# Patient Record
Sex: MALE | Race: WHITE | Employment: FULL TIME | URBAN - METROPOLITAN AREA
[De-identification: names, ages, dates, MRNs, and addresses within clinical notes are randomized per-mention and may not be internally consistent; named-entity substitution may affect disease eponyms.]

---

## 2019-05-29 ENCOUNTER — TRANSFERRED RECORDS (OUTPATIENT)
Dept: HEALTH INFORMATION MANAGEMENT | Facility: CLINIC | Age: 24
End: 2019-05-29

## 2019-05-31 DIAGNOSIS — R86.9 ABNORMAL SEMEN ANALYSIS: Primary | ICD-10-CM

## 2019-05-31 NOTE — PROGRESS NOTES
Semen analysis notable for high viscosity, borderline speed of progression 2-3 and borderline morphology at 2%. This was a repeat sample with similar findings.

## 2019-06-17 ENCOUNTER — PRE VISIT (OUTPATIENT)
Dept: UROLOGY | Facility: CLINIC | Age: 24
End: 2019-06-17

## 2019-06-17 NOTE — TELEPHONE ENCOUNTER
MEDICAL RECORDS REQUEST   [facility]  Urology Clinic  [address] [place] [address]  PHONE: [phone]  Fax: [phone]        FUTURE VISIT INFORMATION                                                   Pop Calvillo, : 1995 scheduled for future visit at [facility]    APPOINTMENT INFORMATION:    Date: 19 7:40AM    Provider:  Maximo Curran MD    Reason for Visit/Diagnosis: Abnormal SA results     REFERRAL INFORMATION:     Referring provider:  STEFANIE BUTT     Specialty: MD    Referring providers clinic:  [facility]    Clinic contact number:  [phone]    RECORDS REQUESTED FOR VISIT                                                     NOTES  STATUS/DETAILS   OFFICE NOTE from referring provider  yes   OFFICE NOTE from other specialist  yes   DISCHARGE SUMMARY from hospital  no   DISCHARGE REPORT from the ER  no   OPERATIVE REPORT  no   MEDICATION LIST  no     PRE-VISIT CHECKLIST      Record collection complete YES- Received recs (SA's)  and handed them to provider - recs sent to HIM to scan   If no, please explain:  Called and LM for pt to return my call RE. His outside SA -  Called and LM 19 (2nd attempt)   Called and LM 19 (3rd attempt)     Pt returned my call said his recs are coming from [facility]-   Called [facility] this morning to check on status, clinic not open until 8AM -      Appointment appropriately scheduled           (right time/right provider) Yes   MyChart activation If no, please explain: In process    Questionnaire complete If no, please explain: In process      Completed by: Sammi Huddleston

## 2019-08-02 ENCOUNTER — PRE VISIT (OUTPATIENT)
Dept: UROLOGY | Facility: CLINIC | Age: 24
End: 2019-08-02

## 2019-08-02 NOTE — TELEPHONE ENCOUNTER
Reason for Visit: Fertility discussion    Diagnosis: abnormal SA    Orders/Procedures/Records: in process    Contact Patient: in process    Rooming Requirements: normal      Olya Aldrich LPN  08/02/19  6:36 AM

## 2019-08-08 ENCOUNTER — OFFICE VISIT (OUTPATIENT)
Dept: UROLOGY | Facility: CLINIC | Age: 24
End: 2019-08-08
Attending: OBSTETRICS & GYNECOLOGY
Payer: COMMERCIAL

## 2019-08-08 VITALS
HEIGHT: 72 IN | BODY MASS INDEX: 29.8 KG/M2 | SYSTOLIC BLOOD PRESSURE: 129 MMHG | DIASTOLIC BLOOD PRESSURE: 83 MMHG | HEART RATE: 68 BPM | WEIGHT: 220 LBS

## 2019-08-08 DIAGNOSIS — E29.1 TESTICULAR HYPOFUNCTION: ICD-10-CM

## 2019-08-08 DIAGNOSIS — R86.8 TERATOSPERMIA: Primary | ICD-10-CM

## 2019-08-08 ASSESSMENT — ENCOUNTER SYMPTOMS
NECK PAIN: 1
MYALGIAS: 1
BACK PAIN: 1
STIFFNESS: 1
MUSCLE WEAKNESS: 0
ARTHRALGIAS: 1
MUSCLE CRAMPS: 0
JOINT SWELLING: 0

## 2019-08-08 ASSESSMENT — PATIENT HEALTH QUESTIONNAIRE - PHQ9
SUM OF ALL RESPONSES TO PHQ QUESTIONS 1-9: 7
SUM OF ALL RESPONSES TO PHQ QUESTIONS 1-9: 7

## 2019-08-08 ASSESSMENT — PAIN SCALES - GENERAL: PAINLEVEL: NO PAIN (0)

## 2019-08-08 ASSESSMENT — MIFFLIN-ST. JEOR: SCORE: 2020.91

## 2019-08-08 NOTE — NURSING NOTE
Chief Complaint   Patient presents with     Consult     Fertility discussion       Bina Joseph MA

## 2019-08-08 NOTE — LETTER
8/8/2019       RE: Pop Calvillo  [address] [facility] [address]     Dear Colleague,    Thank you for referring your patient, Pop Calvillo, to the [facility] UROLOGY AND INST FOR PROSTATE AND UROLOGIC CANCERS at [facility]. Please see a copy of my visit note below.    Dear Dr. Jamee Stephenson, it was my pleasure to see Mr. Pop Calvillo, a 24 year old male here in consultation today for fertility evaluation.      HPI  Pop Calvillo is a 24 year old male with no significant PMH.  He and his partner have been attempting to conceive for the last 11 months.  They have no previous pregnancy together.  No pregnacies with other partners reported.  They have tried timed intercourse for 7 months, including OPK. They have tried IUI but have not tried IVF.  They do not use lubrication during intercourse.  No associated conditions such as ED or sexual dysfunction.     The patient's partner, Olya, is 25 years old.  She is in good health.  She has never been pregnant.  She has been evaluated for infertility and has been found to have PCOS, taking Femara. She was not ovulating on her own but is now having normal monthly menstrual cycles for the last 7 months.        PAST MEDICAL HISTORY:    No chronic medical problems   Puberty normal - was earlier than normal around age 9.  No associated conditions such as ED or sexual dysfunction.  No  problems.     PAST SURG HISTORY  No history of surgery.      Medications as of 8/8/2019:  No prescription medications     ALLERGY:     Allergies   Allergen Reactions     No Known Drug Allergies        SOCIAL HISTORY:  . Occupation: .  No alcohol abuse, drinks socially a few times/month. Is a daily smoker.  Social History     Tobacco Use     Smoking status: Current Every Day Smoker     Packs/day: 1.00     Years: 4.00     Pack years: 4.00     Smokeless tobacco: Former User   Substance Use Topics     Alcohol use: No     Drug  use: None         FAMILY HISTORY: No inherited disorders. Parents did take a few years to get pregnant.  Diabetes runs in family.    REVIEW OF SYSTEMS:  Denies erectile dysfunction, ejaculatory problems, testicular pain. No vision or smell deficits, no chronic sinus or respiratory infections. No recent febrile illness, weight loss. No heat or cold intolerance, gynecomastia, or other endocrine complaints.     Otherwise, no constitutional, eye, ENT, heart, lung, GI , musculoskeletal, skin, neurologic, psychiatric, or hematologic complaints.    GONADOTOXIN EXPOSURE: Unremarkable. Otherwise negative for marijuana, heat, chemicals, pesticides, heavy metals, steroids, chemotherapy or radiation. 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    GENERAL PHYSICAL EXAM  /83   Pulse 68   Ht 1.829 m (6')   Wt 99.8 kg (220 lb)   BMI 29.84 kg/m      Constitutional: No acute distress. Well nourished.   PSYCH: normal mood and affect.  NEURO: normal gait, no focal deficits.   EYES: anicteric, EOMI, PERR  ENT: neck supple,  mucosae moist, no thrush.  CARDIOPULMONARY: breathing non-labored, pulse regular, no peripheral edema.  GI: Abdomen soft, non-tender, no surgical scars, no organomegaly.  MUSCULOSKELETAL: normal limb proportions, no muscle wasting, no contractures.  SKIN: Normal virilized hair distribution, no lesions, warts or rashes over genitalia, abdomen extremities or face.  HEME/LYMPH: no ecchymosis, no lymphadenopathy in groin or neck, no lymphedema.     EXAM:  Phallus circumcised, meatus adequate, no plaques palpated.   Left testis descended , size is 20 cc , consistency is smooth. No intra-testicular masses.  Right testis descended , size is 20 cc , consistency is smooth. No intra-testicular masses.   Epididymes present, non-tender, not enlarged.   Left cord: Vas present. No definite varicocele on exam.  Right cord: Vas present. No obvious varicocele.     Rectal exam deferred.      LABS:  [facility]    5/29/2019  Semen Analysis:  (normal range in parenthesis)   -Volume: 3 ml (1.5-5.0)   -pH: 8.0 (>7.2)   -Concentration: 28.3 million/ml (>15 million/ml)   -% Forward progressive: 84% (>30%)   -Total progressive motile count: 35.7 million (>15.6 million)   -% Normal morphology: 2% (>4%)    4/29/2019 Semen Analysis:  (normal range in parenthesis)   -Volume: 1.8 ml (1.5-5.0)   -pH: 7.6 (>7.2)   -Concentration: 54.3 million/ml (>15 million/ml)   -% Forward progressive: 92% (>30%)   -Total progressive motile count: 44.9 million (>15.6 million)   -% Normal morphology: 3% (>4%)    IMAGING:  No recent relevant imaging    ASSESSMENT:  Infertility.  Female with PCOS.   Testicular hypofunction: Teratospermia.    PLAN:    Discussed OTC supplements to consider taking    He declines blood draw to check baseline hormones.  Major abnormalities are not suspected.    Semen parameters are within acceptable limits for IUI (intrauterine inseminations).    Discussed teratospermia- given fairly ubiquitous finding among high-quality diagnostic fertility labs, this makes the clinical utility questionable.    Advised smoking cessation.    Dr. Curran will contact patient with results and plan/options    Patient was seen and examined with Dr. Magdy Chavez MD  Urology Resident     I saw and examined the patient with the resident today.  I agree with the resident note and plan of care as above.     Maximo Curran MD  Urology Staff

## 2019-08-08 NOTE — PROGRESS NOTES
Dear Dr. Jamee Stephenson, it was my pleasure to see . Pop Calvillo, a 24 year old male here in consultation today for fertility evaluation.      HPI  Pop Calvillo is a 24 year old male with no significant PMH.  He and his partner have been attempting to conceive for the last 11 months.  They have no previous pregnancy together.  No pregnacies with other partners reported.  They have tried timed intercourse for 7 months, including OPK. They have tried IUI but have not tried IVF.  They do not use lubrication during intercourse.  No associated conditions such as ED or sexual dysfunction.     The patient's partner, Olya, is 25 years old.  She is in good health.  She has never been pregnant.  She has been evaluated for infertility and has been found to have PCOS, taking Femara. She was not ovulating on her own but is now having normal monthly menstrual cycles for the last 7 months.        PAST MEDICAL HISTORY:    No chronic medical problems   Puberty normal - was earlier than normal around age 9.  No associated conditions such as ED or sexual dysfunction.  No  problems.     PAST SURG HISTORY  No history of surgery.      Medications as of 8/8/2019:  No prescription medications     ALLERGY:     Allergies   Allergen Reactions     No Known Drug Allergies        SOCIAL HISTORY:  . Occupation: .  No alcohol abuse, drinks socially a few times/month. Is a daily smoker.  Social History     Tobacco Use     Smoking status: Current Every Day Smoker     Packs/day: 1.00     Years: 4.00     Pack years: 4.00     Smokeless tobacco: Former User   Substance Use Topics     Alcohol use: No     Drug use: None         FAMILY HISTORY: No inherited disorders. Parents did take a few years to get pregnant.  Diabetes runs in family.    REVIEW OF SYSTEMS:  Denies erectile dysfunction, ejaculatory problems, testicular pain. No vision or smell deficits, no chronic sinus or respiratory infections. No recent febrile  illness, weight loss. No heat or cold intolerance, gynecomastia, or other endocrine complaints.     Otherwise, no constitutional, eye, ENT, heart, lung, GI , musculoskeletal, skin, neurologic, psychiatric, or hematologic complaints.    GONADOTOXIN EXPOSURE: Unremarkable. Otherwise negative for marijuana, heat, chemicals, pesticides, heavy metals, steroids, chemotherapy or radiation. 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    GENERAL PHYSICAL EXAM  /83   Pulse 68   Ht 1.829 m (6')   Wt 99.8 kg (220 lb)   BMI 29.84 kg/m     Constitutional: No acute distress. Well nourished.   PSYCH: normal mood and affect.  NEURO: normal gait, no focal deficits.   EYES: anicteric, EOMI, PERR  ENT: neck supple,  mucosae moist, no thrush.  CARDIOPULMONARY: breathing non-labored, pulse regular, no peripheral edema.  GI: Abdomen soft, non-tender, no surgical scars, no organomegaly.  MUSCULOSKELETAL: normal limb proportions, no muscle wasting, no contractures.  SKIN: Normal virilized hair distribution, no lesions, warts or rashes over genitalia, abdomen extremities or face.  HEME/LYMPH: no ecchymosis, no lymphadenopathy in groin or neck, no lymphedema.     EXAM:  Phallus circumcised, meatus adequate, no plaques palpated.   Left testis descended , size is 20 cc , consistency is smooth. No intra-testicular masses.  Right testis descended , size is 20 cc , consistency is smooth. No intra-testicular masses.   Epididymes present, non-tender, not enlarged.   Left cord: Vas present. No definite varicocele on exam.  Right cord: Vas present. No obvious varicocele.     Rectal exam deferred.      LABS:  [facility]    5/29/2019 Semen Analysis:  (normal range in parenthesis)   -Volume: 3 ml (1.5-5.0)   -pH: 8.0 (>7.2)   -Concentration: 28.3 million/ml (>15 million/ml)   -% Forward progressive: 84% (>30%)   -Total progressive motile count: 35.7 million (>15.6 million)   -% Normal morphology: 2% (>4%)    4/29/2019 Semen Analysis:  (normal  range in parenthesis)   -Volume: 1.8 ml (1.5-5.0)   -pH: 7.6 (>7.2)   -Concentration: 54.3 million/ml (>15 million/ml)   -% Forward progressive: 92% (>30%)   -Total progressive motile count: 44.9 million (>15.6 million)   -% Normal morphology: 3% (>4%)    IMAGING:  No recent relevant imaging    ASSESSMENT:  Infertility.  Female with PCOS.   Testicular hypofunction: Teratospermia.    PLAN:    Discussed OTC supplements to consider taking    He declines blood draw to check baseline hormones.  Major abnormalities are not suspected.    Semen parameters are within acceptable limits for IUI (intrauterine inseminations).    Discussed teratospermia- given fairly ubiquitous finding among high-quality diagnostic fertility labs, this makes the clinical utility questionable.    Advised smoking cessation.    Dr. Curran will contact patient with results and plan/options    Patient was seen and examined with Dr. Magdy Chavez MD  Urology Resident     I saw and examined the patient with the resident today.  I agree with the resident note and plan of care as above.     Maximo Curran MD  Urology Staff

## 2020-11-09 ENCOUNTER — VIRTUAL VISIT (OUTPATIENT)
Dept: FAMILY MEDICINE | Facility: OTHER | Age: 25
End: 2020-11-09
Payer: COMMERCIAL

## 2020-11-09 PROCEDURE — 99421 OL DIG E/M SVC 5-10 MIN: CPT | Performed by: FAMILY MEDICINE

## 2020-11-09 NOTE — PROGRESS NOTES
"Date: 2020 09:56:15  Clinician: Ethan Allison  Clinician NPI: [id number]  Patient: Pop Calivllo  Patient : 1995  Patient Address: [address] [place] [address]  Patient Phone: [phone]  Visit Protocol: URI  Patient Summary:  Pop is a 25 year old ( : 1995 ) male who initiated a [facility] Visit for COVID-19 (Coronavirus) evaluation and screening. When asked the question \"Please sign me up to receive news, health information and promotions from [facility].\", Pop responded \"No\".    Pop states his symptoms started gradually 5-6 days ago.   His symptoms consist of rhinitis, myalgia, malaise, ageusia, diarrhea, a headache, a cough, nasal congestion, and anosmia. Pop also feels feverish but was unable to measure his temperature.   Symptom details     Nasal secretions: The color of his mucus is clear.    Cough: Pop coughs a few times an hour and his cough is not more bothersome at night. Phlegm comes into his throat when he coughs. He believes his cough is caused by post-nasal drip. The color of the phlegm is clear.     Headache: He states the headache is mild (1-3 on a 10 point pain scale).      Pop denies having vomiting, facial pain or pressure, chills, sore throat, teeth pain, ear pain, wheezing, and nausea. He also denies taking antibiotic medication in the past month, having recent facial or sinus surgery in the past 60 days, double sickening (worsening symptoms after initial improvement), and having a sinus infection within the past year. He is not experiencing dyspnea.   Precipitating events  He has not recently been exposed to someone with influenza. Pop has been in close contact with the following high risk individuals: children under the age of 5 and people with asthma, heart disease or diabetes.   Pertinent COVID-19 (Coronavirus) information  Pop does not work or volunteer as healthcare worker or a . In the past 14 days, Pop has not " worked or volunteered at a healthcare facility or group living setting.   In the past 14 days, he also has not lived in a congregate living setting.   Pop has had a close contact with a laboratory-confirmed COVID-19 patient within 14 days of symptom onset. He was not exposed at his work. Date Pop was exposed to the laboratory-confirmed COVID-19 patient: 11/04/2020   Additional information about contact with COVID-19 (Coronavirus) patient as reported by the patient (free text): My wife tested positive and we life together    Since December 2019, Pop has not been tested for COVID-19 and has had upper respiratory infection (URI) or influenza-like illness.      Date(s) of previous URI or influenza-like illness (free-text): Febuary 2020     Symptoms Pop experienced during previous URI or influenza-like illness as reported by the patient (free-text): Cough, weakness, shortness of breath, dizziness, ect.        Pertinent medical history  Pop needs a return to work/school note.   Weight: 220 lbs   Pop smokes or uses smokeless tobacco.   Weight: 220 lbs    MEDICATIONS: No current medications, ALLERGIES: Penicillins  Clinician Response:  Dear Pop,   Your symptoms show that you may have coronavirus (COVID-19). This illness can cause fever, cough and trouble breathing. Many people get a mild case and get better on their own. Some people can get very sick.  What should I do?  We would like to test you for this virus.   1. Please call [phone] to schedule your visit. Explain that you were referred by [facility] to have a COVID-19 test. Be ready to share your [facility] visit ID number.  * If you need to schedule in [facility] please call [phone] or for [employer] employees please call [phone].  * If you need to schedule in the [place] area please call [phone]. [facility] employees call [phone].  The following will serve as your written order for this COVID Test, ordered by me, for the  "indication of suspected COVID [Z20.828]: The test will be ordered in [employer], our electronic health record, after you are scheduled. It will show as ordered and authorized by Miguel Pathak MD.  Order: COVID-19 (Coronavirus) PCR for [facility]TOMATIC [facility].   2. When it's time for your COVID test:  Stay at least 6 feet away from others. (If someone will drive you to your test, stay in the backseat, as far away from the  as you can.)   Cover your mouth and nose with a mask, tissue or washcloth.  Go straight to the testing site. Don't make any stops on the way there or back.      3.Starting now: Stay home and away from others (self-isolate) until:   You've had no fever---and no medicine that reduces fever---for one full day (24 hours). And...   Your other symptoms have gotten better. For example, your cough or breathing has improved. And...   At least 10 days have passed since your symptoms started.       During this time, don't leave the house except for testing or medical care.   Stay in your own room, even for meals. Use your own bathroom if you can.   Stay away from others in your home. No hugging, kissing or shaking hands. No visitors.  Don't go to work, school or anywhere else.    Clean \"high touch\" surfaces often (doorknobs, counters, handles, etc.). Use a household cleaning spray or wipes. You'll find a full list of  on the EPA website: www.epa.gov/pesticide-registration/list-n-disinfectants-use-against-sars-cov-2.   Cover your mouth and nose with a mask, tissue or washcloth to avoid spreading germs.  Wash your hands and face often. Use soap and water.  Caregivers in these groups are at risk for severe illness due to COVID-19:  o People 65 years and older  o People who live in a nursing home or long-term care facility  o People with chronic disease (lung, heart, cancer, diabetes, kidney, liver, immunologic)  o People who have a weakened immune system, including those who:   Are in cancer " treatment  Take medicine that weakens the immune system, such as corticosteroids  Had a bone marrow or organ transplant  Have an immune deficiency  Have poorly controlled HIV or AIDS  Are obese (body mass index of 40 or higher)  Smoke regularly   o Caregivers should wear gloves while washing dishes, handling laundry and cleaning bedrooms and bathrooms.  o Use caution when washing and drying laundry: Don't shake dirty laundry, and use the warmest water setting that you can.  o For more tips, go to www.cdc.gov/coronavirus/2019-ncov/downloads/10Things.pdf.    4.Sign up for [employer]. We know it's scary to hear that you might have COVID-19. We want to track your symptoms to make sure you're okay over the next 2 weeks. Please look for an email from [employer]---this is a free, online program that we'll use to keep in touch. To sign up, follow the link in the email. Learn more at [email or web address]  How can I take care of myself?   Get lots of rest. Drink extra fluids (unless a doctor has told you not to).   Take Tylenol (acetaminophen) for fever or pain. If you have liver or kidney problems, ask your family doctor if it's okay to take Tylenol.   Adults can take either:    650 mg (two 325 mg pills) every 4 to 6 hours, or...   1,000 mg (two 500 mg pills) every 8 hours as needed.    Note: Don't take more than 3,000 mg in one day. Acetaminophen is found in many medicines (both prescribed and over-the-counter medicines). Read all labels to be sure you don't take too much.   For children, check the Tylenol bottle for the right dose. The dose is based on the child's age or weight.    If you have other health problems (like cancer, heart failure, an organ transplant or severe kidney disease): Call your specialty clinic if you don't feel better in the next 2 days.       Know when to call 911. Emergency warning signs include:    Trouble breathing or shortness of breath Pain or pressure in the chest that doesn't  go away Feeling confused like you haven't felt before, or not being able to wake up Bluish-colored lips or face.  Where can I get more information?   [facility] -- About COVID-19: [email or web address]   CDC -- What to Do If You're Sick: www.cdc.gov/coronavirus/2019-ncov/about/steps-when-sick.html   CDC -- Ending Home Isolation: www.cdc.gov/coronavirus/2019-ncov/hcp/disposition-in-home-patients.html   [facility] -- Caring for Someone: www.cdc.gov/coronavirus/2019-ncov/if-you-are-sick/care-for-someone.html   [facility] -- Interim Guidance for Hospital Discharge to Home: www.[facility].mn.us/diseases/coronavirus/hcp/hospdischarge.pdf   [facility] clinical trials (COVID-19 research studies): clinicalaffairs.[facility]-clinical-trials    Below are the COVID-19 hotlines at the [facility] ([facility]). Interpreters are available.    For health questions: Call 625-476-0920 or 1-582.124.3011 (7 a.m. to 7 p.m.) For questions about schools and childcare: Call 690-616-2631 or 1-614.815.4136 (7 a.m. to 7 p.m.)    Diagnosis: Contact with and (suspected) exposure to other viral communicable diseases  Diagnosis ICD: Z20.828